# Patient Record
Sex: MALE | Race: WHITE
[De-identification: names, ages, dates, MRNs, and addresses within clinical notes are randomized per-mention and may not be internally consistent; named-entity substitution may affect disease eponyms.]

---

## 2017-01-22 ENCOUNTER — HOSPITAL ENCOUNTER (EMERGENCY)
Dept: HOSPITAL 25 - ED | Age: 82
Discharge: HOME | End: 2017-01-22
Payer: MEDICARE

## 2017-01-22 ENCOUNTER — HOSPITAL ENCOUNTER (EMERGENCY)
Dept: HOSPITAL 25 - UCEAST | Age: 82
Discharge: LEFT BEFORE DISCHARGE | End: 2017-01-22
Payer: MEDICARE

## 2017-01-22 VITALS — DIASTOLIC BLOOD PRESSURE: 56 MMHG | SYSTOLIC BLOOD PRESSURE: 130 MMHG

## 2017-01-22 VITALS — SYSTOLIC BLOOD PRESSURE: 117 MMHG | DIASTOLIC BLOOD PRESSURE: 62 MMHG

## 2017-01-22 DIAGNOSIS — J18.9: Primary | ICD-10-CM

## 2017-01-22 DIAGNOSIS — Z87.891: ICD-10-CM

## 2017-01-22 DIAGNOSIS — Z96.653: ICD-10-CM

## 2017-01-22 DIAGNOSIS — R07.9: ICD-10-CM

## 2017-01-22 DIAGNOSIS — R05: ICD-10-CM

## 2017-01-22 DIAGNOSIS — R07.89: ICD-10-CM

## 2017-01-22 DIAGNOSIS — I10: ICD-10-CM

## 2017-01-22 LAB
ALBUMIN SERPL BCG-MCNC: 3.9 G/DL (ref 3.2–5.2)
ALP SERPL-CCNC: 44 U/L (ref 34–104)
ALT SERPL W P-5'-P-CCNC: 16 U/L (ref 7–52)
ANION GAP SERPL CALC-SCNC: 7 MMOL/L (ref 2–11)
AST SERPL-CCNC: 22 U/L (ref 13–39)
BUN SERPL-MCNC: 14 MG/DL (ref 6–24)
BUN/CREAT SERPL: 13.7 (ref 8–20)
CALCIUM SERPL-MCNC: 9 MG/DL (ref 8.6–10.3)
CHLORIDE SERPL-SCNC: 106 MMOL/L (ref 101–111)
GLOBULIN SER CALC-MCNC: 2.4 G/DL (ref 2–4)
GLUCOSE SERPL-MCNC: 176 MG/DL (ref 70–100)
HCO3 SERPL-SCNC: 24 MMOL/L (ref 22–32)
HCT VFR BLD AUTO: 42 % (ref 42–52)
HGB BLD-MCNC: 14 G/DL (ref 14–18)
MCH RBC QN AUTO: 29 PG (ref 27–31)
MCHC RBC AUTO-ENTMCNC: 33 G/DL (ref 31–36)
MCV RBC AUTO: 89 FL (ref 80–94)
POTASSIUM SERPL-SCNC: 3.9 MMOL/L (ref 3.5–5)
PROT SERPL-MCNC: 6.3 G/DL (ref 6.4–8.9)
RBC # BLD AUTO: 4.76 10^6/UL (ref 4–5.4)
SODIUM SERPL-SCNC: 137 MMOL/L (ref 133–145)
TROPONIN I SERPL-MCNC: 0.01 NG/ML (ref ?–0.04)
WBC # BLD AUTO: 6.4 10^3/UL (ref 3.5–10.8)

## 2017-01-22 PROCEDURE — G0463 HOSPITAL OUTPT CLINIC VISIT: HCPCS

## 2017-01-22 PROCEDURE — 96374 THER/PROPH/DIAG INJ IV PUSH: CPT

## 2017-01-22 PROCEDURE — 83880 ASSAY OF NATRIURETIC PEPTIDE: CPT

## 2017-01-22 PROCEDURE — 85025 COMPLETE CBC W/AUTO DIFF WBC: CPT

## 2017-01-22 PROCEDURE — 99213 OFFICE O/P EST LOW 20 MIN: CPT

## 2017-01-22 PROCEDURE — 71020: CPT

## 2017-01-22 PROCEDURE — 80053 COMPREHEN METABOLIC PANEL: CPT

## 2017-01-22 PROCEDURE — 84484 ASSAY OF TROPONIN QUANT: CPT

## 2017-01-22 PROCEDURE — 99283 EMERGENCY DEPT VISIT LOW MDM: CPT

## 2017-01-22 PROCEDURE — 83605 ASSAY OF LACTIC ACID: CPT

## 2017-01-22 PROCEDURE — 36415 COLL VENOUS BLD VENIPUNCTURE: CPT

## 2017-01-22 PROCEDURE — 93005 ELECTROCARDIOGRAM TRACING: CPT

## 2017-01-22 NOTE — RAD
INDICATION:  Cough and fever.



COMPARISON:  Comparison is made with a prior chest x-ray study from June 13, 2015.



TECHNIQUE: Dual-energy PA  and lateral views of the chest were obtained.



FINDINGS:   The heart is within normal limits in size. Mediastinal and hilar contours

appear within normal limits.



The lungs are underinflated. There are small bibasilar infiltrates. No pleural effusion is

seen. There is flattening of the diaphragms consistent with chronic character pulmonary

disease.



IMPRESSION:  EXPIRATORY EXAM, SMALL BIBASILAR INFILTRATES.

## 2017-01-22 NOTE — UC
Respiratory Complaint HPI





- HPI Summary


HPI Summary: 





Pt c/o cough, fever, chest pain, SOB X 3 days.  Feels worse today.  Has history 

of pneumonia





- History of Current Complaint


Chief Complaint: UCRespiratory


Stated Complaint: COUGH,FEVER


Time Seen by Provider: 01/22/17 15:48


Hx Obtained From: Patient


Onset/Duration: Gradual Onset, Lasting Days, Worse Since - onset


Timing: Constant


Severity Initially: Mild


Severity Currently: Moderate


Character: Cough: Productive - clear


Aggravating Factors: Recumbent Position


Alleviating Factors: Nothing


Associated Signs And Symptoms: Positive: Fever, Chills, URI, Nasal Congestion





- Risk Factors


Pulmonary Embolism Risk Factors: Negative


Cardiac Risk Factors: Hypertension, Elevated Lipids


Pseudomonas Risk Factors: Negative





- Allergies/Home Medications


Allergies/Adverse Reactions: 


 Allergies











Allergy/AdvReac Type Severity Reaction Status Date / Time


 


No Known Allergies Allergy   Verified 01/22/17 15:46











Home Medications: 


 Home Medications





Ascorbic Acid TAB* [Vitamin C  TAB*] 2,000 mg PO DAILY 01/22/17 [History 

Confirmed 01/22/17]


Rx Nasal Spray*  01/22/17 [History]


guaiFENesin ER TAB [Mucinex*]  PRN 01/22/17 [History]











PMH/Surg Hx/FS Hx/Imm Hx


Previously Healthy: No - see PMH


Endocrine History Of: 


   Denies: Diabetes, Thyroid Disease


Cardiovascular History Of: Reports: Hypertension - meds


   Denies: Cardiac Disorders, Pacemaker/ICD


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


   Denies: COPD


GI/ History Of: 


   Denies: Ulcer, Renal Disease





- Surgical History


Surgical History: Yes


Surgery Procedure, Year, and Place: Bilateral Knees- REPLACEMENTS, Chinchilla Magan,

.  SINUS





- Family History


Known Family History: Positive: Cardiac Disease, Hypertension





- Social History


Lives: With Family


Alcohol Use: Occasionally


Alcohol Amount: Glass of wine


Substance Use Type: None


Smoking Status (MU): Former Smoker


Type: Cigarettes


Amount Used/How Often: 1 PPD


Length of Time of Smoking/Using Tobacco: 5-6 years


Have You Smoked in the Last Year: No





- Immunization History


Most Recent Influenza Vaccination: 10/2014





Review of Systems


Constitutional: Fever, Chills, Fatigue


Skin: Negative


Eyes: Negative


ENT: Other - nasal congestion


Respiratory: Shortness Of Breath, Cough


Cardiovascular: Chest Pain


Gastrointestinal: Negative


Genitourinary: Negative


Motor: Negative


Neurovascular: Negative


Musculoskeletal: Myalgia


Neurological: Negative


Psychological: Negative


All Other Systems Reviewed And Are Negative: Yes





Physical Exam


Triage Information Reviewed: Yes


Appearance: Ill-Appearing


Vital Signs: 


 Initial Vital Signs











Temp  100.2 F   01/22/17 15:42


 


Pulse  85   01/22/17 15:42


 


Resp  20   01/22/17 15:42


 


BP  109/63   01/22/17 15:42


 


Pulse Ox  94   01/22/17 15:42











Vital Signs Reviewed: Yes


ENT Exam: Other


ENT: Positive: Nasal congestion


Neck exam: Normal


Respiratory: Positive: Decreased breath sounds - bilateral bases


Cardiovascular Exam: Normal


Musculoskeletal Exam: Normal


Neurological Exam: Normal


Psychological Exam: Normal


Skin Exam: Normal





[facility] Diagnostic Evaluation





- Laboratory


O2 Sat by Pulse Oximetry: 94





Respiratory Course/Dx





- Course


Course Of Treatment: I reviewed the EKG with the patient and though the EKG 

does not reveal an acute cardiac event we can not rule that out and have 

recommended that the pt seek care at the Emergency Department.





- Differential Dx/Diagnosis


Differential Diagnosis/HQI/PQRI: Pulmonary Embolism, Other - pneumonia, MI


Provider Diagnoses: pneumonia.  Chest pain





- Physician Notification/Consults


Discussed Patient Care With: Concepción Castaneda at [facility].


Time Discussed With Above Provider: 17:47 - pt accepted at [facility] ED





Discharge





- Discharge Plan


Condition: Stable


Disposition: HOME


Prescriptions: 


Albuterol HFA INHALER* [Ventolin HFA Inhaler*] 1 - 2 puff INH Q4H PRN #1 mdi


 PRN Reason: Sob/Wheezing


Azithromycin TAB* [Zithromax TAB (Z-FREYA) 250 mg #6 tabs] 2 tab PO .TODAY, THEN 

1 DAILY #1 freya


predniSONE TAB* [Deltasone TAB*] 30 mg PO DAILY #12 tab


Patient Education Materials:  Pneumonia (ED)


Referrals: 


Aminah Marroquin [Primary Care Provider] - 


Additional Instructions: 


Please note that you have been recommend to go to the Emergency room for 

immediate follow up care.

## 2017-01-22 NOTE — ED
Kar KRISHNAN Janilya, scribed for Kalpesh Pichardo MD on 01/22/17 at 1840 .





Respiratory





- HPI Summary


HPI Summary: 


A 81 y/o male came in to [facility] presenting w/ a gradual onset of constant CP due 

to severe cough starting 4-5 days. Pt reports his chest feels painful and it is 

"a struggle to cough things up". Pt has a cough that produces clear, white, and 

sometimes yellowish phlegm. In addition, pt reports fever. Pt visited [facility] and 

had CXR and EKG done. There, he was diagnosed with pneumonia. Pt was 

recommended a breathing nebulizer, which he used. It alleviated his condition. 

Pt feels better now; his CP is "diminishing rapidly". 


PMHx asthma, for which pt uses inhaler.





- History of Current Complaint


Chief Complaint: EDChestPainROMI


Stated Complaint: CHEST PAIN/CONV CARE


Time Seen by Provider: 01/22/17 18:26


Hx Obtained From: Patient


Onset/Duration: Gradual Onset, Lasting Days, Still Present


Initial Severity: Moderate


Current Severity: Moderate


Pain Intensity: 0


Character: Cough (Productive)


Sputum Color: Clear, White, Yellow


Aggravating Factor(s): Nothing


Alleviating Factor(s): Oxygen


Associated Signs and Symptoms: Fever, Chest Pain with Cough





- Allergy/Home Medications


Allergies/Adverse Reactions: 


 Allergies











Allergy/AdvReac Type Severity Reaction Status Date / Time


 


No Known Allergies Allergy   Verified 01/22/17 15:46














PMH/Surg Hx/FS Hx/Imm Hx


Endocrine/Hematology History: 


   Denies: Hx Diabetes, Hx Thyroid Disease


Cardiovascular History: Reports: Hx Hypertension - meds


   Denies: Hx Pacemaker/ICD


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


   Denies: Hx Chronic Obstructive Pulmonary Disease (COPD)


GI History: 


   Denies: Hx Ulcer


 History: 


   Denies: Hx Renal Disease


Sensory History: 


   Denies: Hx Hearing Aid


Psychiatric History: 


   Denies: Hx Panic Disorder





- Cancer History


Cancer Type, Location and Year: Basal cell removed from forehead 2012





- Surgical History


Surgery Procedure, Year, and Place: Bilateral Knees- REPLACEMENTS, Chinchilla Magan,

.  SINUS


Infectious Disease History: No


Infectious Disease History: 


   Denies: Hx Clostridium Difficile, Hx Hepatitis, Hx Human Immunodeficiency 

Virus (HIV), Hx of Known/Suspected MRSA, Hx Shingles, Hx Tuberculosis, Hx Known/

Suspected VRE, Hx Known/Suspected VRSA, History Other Infectious Disease, 

Traveled Outside the [place] in Last 30 Days





- Family History


Known Family History: Positive: Cardiac Disease, Hypertension





- Social History


Alcohol Use: Occasionally


Alcohol Amount: Glass of wine


Substance Use Type: Reports: None


Smoking Status (MU): Former Smoker


Type: Cigarettes


Amount Used/How Often: 1 PPD


Length of Time of Smoking/Using Tobacco: 5-6 years


Have You Smoked in the Last Year: No





Review of Systems


Positive: Fever


Positive: Chest Pain


Positive: Cough


All Other Systems Reviewed And Are Negative: Yes





Physical Exam


Triage Information Reviewed: Yes


Vital Signs On Initial Exam: 


 Initial Vitals











Temp Pulse Resp BP Pulse Ox


 


 99.2 F   95   16   141/65   95 


 


 01/22/17 18:04  01/22/17 18:04  01/22/17 18:04  01/22/17 18:04  01/22/17 18:04











Vital Signs Reviewed: Yes


Appearance: Positive: Well-Appearing, No Pain Distress


Skin: Positive: Warm, Skin Color Reflects Adequate Perfusion, Dry


Head/Face: Positive: Normal Head/Face Inspection


Eyes: Positive: Normal


ENT: Positive: Normal ENT inspection


Neck: Positive: Supple, Nontender


Respiratory/Lung Sounds: Positive: Clear to Auscultation, Breath Sounds Present


Cardiovascular: Positive: RRR


Abdomen Description: Positive: Nontender, Soft


Bowel Sounds: Positive: Present


Musculoskeletal: Positive: Normal


Neurological: Positive: Normal


Psychiatric: Positive: Normal, Affect/Mood Appropriate





Diagnostics





- Vital Signs


 Vital Signs











  Temp Pulse Resp BP Pulse Ox


 


 01/22/17 18:24   88  13   93


 


 01/22/17 18:22     119/43 


 


 01/22/17 18:04  99.2 F  95  16  141/65  95














- Laboratory


Lab Results: 


 Lab Results











  01/22/17 01/22/17 01/22/17 Range/Units





  18:25 18:25 18:25 


 


WBC  6.4    (3.5-10.8)  10^3/ul


 


RBC  4.76    (4.0-5.4)  10^6/ul


 


Hgb  14.0    (14.0-18.0)  g/dl


 


Hct  42    (42-52)  %


 


MCV  89    (80-94)  fL


 


MCH  29    (27-31)  pg


 


MCHC  33    (31-36)  g/dl


 


RDW  15    (10.5-15)  %


 


Plt Count  166    (150-450)  10^3/ul


 


MPV  8    (7.4-10.4)  um3


 


Neut % (Auto)  63.0    (38-83)  %


 


Lymph % (Auto)  23.7 L    (25-47)  %


 


Mono % (Auto)  9.4 H    (1-9)  %


 


Eos % (Auto)  3.3    (0-6)  %


 


Baso % (Auto)  0.6    (0-2)  %


 


Absolute Neuts (auto)  4.0    (1.5-7.7)  10^3/ul


 


Absolute Lymphs (auto)  1.5    (1.0-4.8)  10^3/ul


 


Absolute Monos (auto)  0.6    (0-0.8)  10^3/ul


 


Absolute Eos (auto)  0.2    (0-0.6)  10^3/ul


 


Absolute Basos (auto)  0    (0-0.2)  10^3/ul


 


Absolute Nucleated RBC  0    10^3/ul


 


Nucleated RBC %  0.1    


 


Sodium   137   (133-145)  mmol/L


 


Potassium   3.9   (3.5-5.0)  mmol/L


 


Chloride   106   (101-111)  mmol/L


 


Carbon Dioxide   24   (22-32)  mmol/L


 


Anion Gap   7   (2-11)  mmol/L


 


BUN   14   (6-24)  mg/dL


 


Creatinine   1.02   (0.67-1.17)  mg/dL


 


Est GFR ( Amer)   89.9   (>60)  


 


Est GFR (Non-Af Amer)   69.9   (>60)  


 


BUN/Creatinine Ratio   13.7   (8-20)  


 


Glucose   176 H   ()  mg/dL


 


Lactic Acid    1.4  (0.5-2.0)  mmol/L


 


Calcium   9.0   (8.6-10.3)  mg/dL


 


Total Bilirubin   0.60   (0.2-1.0)  mg/dL


 


AST   22   (13-39)  U/L


 


ALT   16   (7-52)  U/L


 


Alkaline Phosphatase   44   ()  U/L


 


Troponin I   0.01   (<0.04)  ng/mL


 


B-Natriuretic Peptide    ( - 100) pg/mL


 


Total Protein   6.3 L   (6.4-8.9)  g/dL


 


Albumin   3.9   (3.2-5.2)  g/dL


 


Globulin   2.4   (2-4)  g/dL


 


Albumin/Globulin Ratio   1.6   (1-3)  














  01/22/17 Range/Units





  18:25 


 


WBC   (3.5-10.8)  10^3/ul


 


RBC   (4.0-5.4)  10^6/ul


 


Hgb   (14.0-18.0)  g/dl


 


Hct   (42-52)  %


 


MCV   (80-94)  fL


 


MCH   (27-31)  pg


 


MCHC   (31-36)  g/dl


 


RDW   (10.5-15)  %


 


Plt Count   (150-450)  10^3/ul


 


MPV   (7.4-10.4)  um3


 


Neut % (Auto)   (38-83)  %


 


Lymph % (Auto)   (25-47)  %


 


Mono % (Auto)   (1-9)  %


 


Eos % (Auto)   (0-6)  %


 


Baso % (Auto)   (0-2)  %


 


Absolute Neuts (auto)   (1.5-7.7)  10^3/ul


 


Absolute Lymphs (auto)   (1.0-4.8)  10^3/ul


 


Absolute Monos (auto)   (0-0.8)  10^3/ul


 


Absolute Eos (auto)   (0-0.6)  10^3/ul


 


Absolute Basos (auto)   (0-0.2)  10^3/ul


 


Absolute Nucleated RBC   10^3/ul


 


Nucleated RBC %   


 


Sodium   (133-145)  mmol/L


 


Potassium   (3.5-5.0)  mmol/L


 


Chloride   (101-111)  mmol/L


 


Carbon Dioxide   (22-32)  mmol/L


 


Anion Gap   (2-11)  mmol/L


 


BUN   (6-24)  mg/dL


 


Creatinine   (0.67-1.17)  mg/dL


 


Est GFR ( Amer)   (>60)  


 


Est GFR (Non-Af Amer)   (>60)  


 


BUN/Creatinine Ratio   (8-20)  


 


Glucose   ()  mg/dL


 


Lactic Acid   (0.5-2.0)  mmol/L


 


Calcium   (8.6-10.3)  mg/dL


 


Total Bilirubin   (0.2-1.0)  mg/dL


 


AST   (13-39)  U/L


 


ALT   (7-52)  U/L


 


Alkaline Phosphatase   ()  U/L


 


Troponin I   (<0.04)  ng/mL


 


B-Natriuretic Peptide  35 ( - 100) pg/mL


 


Total Protein   (6.4-8.9)  g/dL


 


Albumin   (3.2-5.2)  g/dL


 


Globulin   (2-4)  g/dL


 


Albumin/Globulin Ratio   (1-3)  











Result Diagrams: 


 01/22/17 18:25





 01/22/17 18:25


Lab Statement: Any lab studies that have been ordered have been reviewed, and 

results considered in the medical decision making process.





- EKG


  ** 1622


Cardiac Rate: NL - at 84 bpm


EKG Rhythm: Sinus Rhythm





Disposition





- Course


Course Of Treatment: Everette Balbuena Presented with a few days of coughing and 

was specifically C/O having more and more chest pain with coughing.   He was 

seen at [facility] and given a nebulized and feels a lot better and says his chest 

feels better. He was given his first dose of steroids IV here and his trop was 

negative.





- Diagnoses


Provider Diagnoses: 


 Pneumonia, Chest wall pain








Discharge





- Discharge Plan


Condition: Stable


Disposition: HOME


Patient Education Materials:  Pneumonia (ED), Chest Pain (ED)


Referrals: 


Mera Davila MD [Primary Care Provider] - 


Additional Instructions: 


Follow up with your primary care provider if symptoms persist or worsen.





The documentation as recorded by the Kar knight Janilya accurately 

reflects the service I personally performed and the decisions made by me, Kalpesh Pichardo MD.

## 2017-01-30 ENCOUNTER — HOSPITAL ENCOUNTER (EMERGENCY)
Dept: HOSPITAL 25 - ED | Age: 82
Discharge: HOME | End: 2017-01-30
Payer: MEDICARE

## 2017-01-30 VITALS — SYSTOLIC BLOOD PRESSURE: 138 MMHG | DIASTOLIC BLOOD PRESSURE: 64 MMHG

## 2017-01-30 DIAGNOSIS — Z87.891: ICD-10-CM

## 2017-01-30 DIAGNOSIS — J44.9: ICD-10-CM

## 2017-01-30 DIAGNOSIS — R05: Primary | ICD-10-CM

## 2017-01-30 PROCEDURE — 71020: CPT

## 2017-01-30 PROCEDURE — 99283 EMERGENCY DEPT VISIT LOW MDM: CPT

## 2017-01-30 NOTE — ED
John KRISHNAN Adam, scribed for Kalpesh Pichardo MD on 01/30/17 at 1300 .





Respiratory





- HPI Summary


HPI Summary: 


Pt is an 82 year old male presenting with an episode of aspiration this 

morning. He states that he was taking his regular medication between 11:00 and 

12:00 when he swallowed a pill and began coughing. He states that he coughed up 

some yellow substance which matched the color of the pill, but he is concerned 

that there could still be something lodged in his throat. He is also feeling 

more congested since the episode of coughing. 








- History of Current Complaint


Chief Complaint: EDRespiratoryDistress


Stated Complaint: MEDS POSS STUCK IN THROAT


Time Seen by Provider: 01/30/17 12:48


Hx Obtained From: Patient


Onset/Duration: Sudden Onset, Lasting Minutes, Resolved


Initial Severity: Moderate


Current Severity: None


Pain Intensity: 0


Character: Cough (Productive)


Sputum Amount: Small


Sputum Color: Clear


Aggravating Factor(s): Nothing


Alleviating Factor(s): Nothing





- Allergy/Home Medications


Allergies/Adverse Reactions: 


 Allergies











Allergy/AdvReac Type Severity Reaction Status Date / Time


 


No Known Allergies Allergy   Verified 01/22/17 15:46














PMH/Surg Hx/FS Hx/Imm Hx


Endocrine/Hematology History: 


   Denies: Hx Diabetes, Hx Thyroid Disease


Cardiovascular History: Reports: Hx Hypertension - meds


   Denies: Hx Pacemaker/ICD


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


   Denies: Hx Chronic Obstructive Pulmonary Disease (COPD)


GI History: 


   Denies: Hx Ulcer


 History: 


   Denies: Hx Renal Disease


Sensory History: 


   Denies: Hx Hearing Aid


Psychiatric History: 


   Denies: Hx Panic Disorder





- Cancer History


Cancer Type, Location and Year: Basal cell removed from forehead 2012





- Surgical History


Surgery Procedure, Year, and Place: Bilateral Knees- REPLACEMENTS, Chinchilla Magan,

.  SINUS


Infectious Disease History: No


Infectious Disease History: 


   Denies: Hx Clostridium Difficile, Hx Hepatitis, Hx Human Immunodeficiency 

Virus (HIV), Hx of Known/Suspected MRSA, Hx Shingles, Hx Tuberculosis, Hx Known/

Suspected VRE, Hx Known/Suspected VRSA, History Other Infectious Disease, 

Traveled Outside the [place] in Last 30 Days





- Family History


Known Family History: Positive: Cardiac Disease, Hypertension





- Social History


Occupation: Retired


Lives: With Family


Alcohol Use: Occasionally


Alcohol Amount: Glass of wine


Hx Substance Use: No


Substance Use Type: Reports: None


Hx Tobacco Use: Yes


Smoking Status (MU): Former Smoker


Type: Cigarettes


Amount Used/How Often: 1 PPD


Length of Time of Smoking/Using Tobacco: 5-6 years


Have You Smoked in the Last Year: No





Review of Systems


Positive: Other - Congestion


Positive: Cough, Other - Aspirated on pill


All Other Systems Reviewed And Are Negative: Yes





Physical Exam


Triage Information Reviewed: Yes


Vital Signs On Initial Exam: 


 Initial Vitals











Temp Pulse Resp BP Pulse Ox


 


 97.9 F   87   20   143/66   94 


 


 01/30/17 12:06  01/30/17 12:06  01/30/17 12:06  01/30/17 12:06  01/30/17 12:06











Vital Signs Reviewed: Yes


Appearance: Positive: Well-Appearing, No Pain Distress


Skin: Positive: Warm, Skin Color Reflects Adequate Perfusion, Dry


Head/Face: Positive: Normal Head/Face Inspection


Eyes: Positive: Normal


ENT: Positive: Normal ENT inspection


Neck: Positive: Supple, Nontender


Respiratory/Lung Sounds: Positive: Clear to Auscultation, Breath Sounds Present


Cardiovascular: Positive: RRR


Abdomen Description: Positive: Nontender, Soft


Bowel Sounds: Positive: Present


Musculoskeletal: Positive: Normal


Neurological: Positive: Normal


Psychiatric: Positive: Normal, Affect/Mood Appropriate





- [place] Coma Scale


Coma Scale Total: 15





Diagnostics





- Vital Signs


 Vital Signs











  Temp Pulse Resp BP Pulse Ox


 


 01/30/17 12:13    20  


 


 01/30/17 12:06  97.9 F  87  20  143/66  94














- Laboratory


Lab Statement: Any lab studies that have been ordered have been reviewed, and 

results considered in the medical decision making process.





- Radiology


  ** CXR


Radiology Interpretation Completed By: Radiologist - IMPRESSION:  FINDINGS 

CONSISTENT WITH COPD, NO EVIDENCE FOR ACUTE FINDING.





Disposition





- Course


Course Of Treatment: Mr. Balbuena apparently aspirated a turmeric gel cap and had 

some paroxismal coughing bring up yellow sputum and some plastic like pieces.  

He feels a lot better now and his CXR is normal.  I will D/C him for expectant 

treatment and we discussed fever etc.





- Diagnoses


Provider Diagnoses: 


 Aspiration








Discharge





- Discharge Plan


Condition: Stable


Disposition: HOME


Patient Education Materials:  Dyspnea (ED)


Referrals: 


Mera Davila MD [Primary Care Provider] - 


Additional Instructions: 


Follow up with Dr. Davila.





The documentation as recorded by the John knight Adam accurately reflects 

the service I personally performed and the decisions made by me, Kalpesh Pichardo MD.

## 2017-01-30 NOTE — RAD
INDICATION:  Choking episode.



COMPARISON:  Comparison is made with a prior chest x-ray study from January 22, 2017.



TECHNIQUE: Dual-energy PA  and lateral views of the chest were obtained.



FINDINGS:   The heart is upper limits of normal in size and unchanged from the prior exam.



The lungs are clear. There is flattening of the diaphragms consistent with chronic

obstructive pulmonary disease. No pleural effusion is seen. 



IMPRESSION:  FINDINGS CONSISTENT WITH COPD, NO EVIDENCE FOR ACUTE FINDING.

## 2017-12-15 ENCOUNTER — HOSPITAL ENCOUNTER (EMERGENCY)
Dept: HOSPITAL 25 - UCEAST | Age: 82
Discharge: HOME | End: 2017-12-15
Payer: MEDICARE

## 2017-12-15 VITALS — DIASTOLIC BLOOD PRESSURE: 63 MMHG | SYSTOLIC BLOOD PRESSURE: 109 MMHG

## 2017-12-15 DIAGNOSIS — J06.9: Primary | ICD-10-CM

## 2017-12-15 DIAGNOSIS — Z72.89: ICD-10-CM

## 2017-12-15 DIAGNOSIS — Z87.891: ICD-10-CM

## 2017-12-15 PROCEDURE — 99212 OFFICE O/P EST SF 10 MIN: CPT

## 2017-12-15 PROCEDURE — G0463 HOSPITAL OUTPT CLINIC VISIT: HCPCS

## 2017-12-15 NOTE — UC
Respiratory Complaint HPI





- HPI Summary


HPI Summary: 


2 DAYS OF WORSENING COUGH AND CONGESTION. NO FEVER, EAR PAIN, N/V/D. REPORTS HE 

HAS A TENDENCY TO PROGRESS TO "WALKING PNEUMONIA" AND IS CONCERNED THIS WILL 

HAPPEN AGAIN WITH THIS ILLNESS.





- History of Current Complaint


Chief Complaint: UCRespiratory


Stated Complaint: CHEST CONGESTION


Time Seen by Provider: 12/15/17 10:53


Hx Obtained From: Patient


Onset/Duration: Gradual Onset, Lasting Days, Still Present


Timing: Constant


Severity Initially: Moderate


Severity Currently: Moderate


Pain Intensity: 0


Pain Scale Used: 0-10 Numeric


Character: Cough: Productive


Aggravating Factors: Nothing


Alleviating Factors: Nothing


Associated Signs And Symptoms: Positive: URI, Nasal Congestion.  Negative: 

Dyspnea, Fever, Pleuritic Chest Pain, Wheezing





- Allergies/Home Medications


Allergies/Adverse Reactions: 


 Allergies











Allergy/AdvReac Type Severity Reaction Status Date / Time


 


No Known Allergies Allergy   Verified 12/15/17 08:48











Home Medications: 


 Home Medications





Albuterol HFA INHALER* [Ventolin HFA Inhaler*] 1 puff INH Q6HR PRN 12/15/17 [

History Confirmed 12/15/17]


Ascorbic Acid TAB* [Vitamin C  TAB*] 500 mg PO DAILY 12/15/17 [History 

Confirmed 12/15/17]


Aspirin EC Low Dose* [Ecotrin EC Low Dose 81 MG*] 81 mg PO DAILY 12/15/17 [

History Confirmed 12/15/17]


Rosuvastatin Calcium [Crestor] 20 mg PO DAILY 12/15/17 [History Confirmed 12/15/

17]


Sertraline* [Zoloft*] 100 mg PO BEDTIME 12/15/17 [History Confirmed 12/15/17]


Telmisartan [Micardis] 0.5 mg PO DAILY 12/15/17 [History Confirmed 12/15/17]











PMH/Surg Hx/FS Hx/Imm Hx


Cardiovascular History: Hypertension





- Surgical History


Surgical History: Yes


Surgery Procedure, Year, and Place: Bilateral Knees- REPLACEMENTS, Chinchilla Magan,

.  SINUS





- Family History


Known Family History: Positive: Cardiac Disease, Hypertension





- Social History


Alcohol Use: Occasionally


Alcohol Amount: Glass of wine


Substance Use Type: None


Smoking Status (MU): Former Smoker


Type: Cigarettes


Amount Used/How Often: 1 PPD


Length of Time of Smoking/Using Tobacco: 5-6 years


Have You Smoked in the Last Year: No





- Immunization History


Most Recent Influenza Vaccination: 10/2014





Review of Systems


Constitutional: Negative


ENT: Nasal Discharge


Respiratory: Cough


Cardiovascular: Negative


Gastrointestinal: Negative


Neurological: Headache


All Other Systems Reviewed And Are Negative: Yes





Physical Exam


Triage Information Reviewed: Yes


Appearance: Well-Appearing, No Pain Distress, Well-Nourished


Vital Signs: 


 Initial Vital Signs











Temp  97 F   12/15/17 08:53


 


Pulse  79   12/15/17 08:53


 


Resp  16   12/15/17 08:53


 


BP  118/53   12/15/17 08:53


 


Pulse Ox  100   12/15/17 08:53











Vital Signs Reviewed: Yes


Eyes: Positive: Conjunctiva Clear


ENT: Positive: Hearing grossly normal, Pharynx normal, TMs normal


Neck: Positive: Supple, Nontender, No Lymphadenopathy


Respiratory Exam: Normal


Cardiovascular Exam: Normal


Abdomen Description: Positive: Soft


Musculoskeletal: Positive: No Edema


Neurological: Positive: Alert


Psychological: Positive: Age Appropriate Behavior


Skin: Negative: rashes





[facility] Diagnostic Evaluation





- Laboratory


O2 Sat by Pulse Oximetry: 97





Respiratory Course/Dx





- Differential Dx/Diagnosis


Provider Diagnoses: ACUTE URI





Discharge





- Discharge Plan


Condition: Stable


Disposition: HOME


Prescriptions: 


Azithromycin [Azithromycin 500 MG TAB] 500 mg PO DAILY #5 tab


Patient Education Materials:  Upper Respiratory Infection (ED), Acute 

Bronchitis (ED)


Referrals: 


Mera Davila MD [Primary Care Provider] - If Needed


Additional Instructions: 


YOUR SYMPTOMS MAY BE VIRALLY MEDIATED BUT GIVEN YOUR HISTORY AND CONCERN FOR 

PROGRESSION TO PNEUMONIA WE WILL COVER YOU WITH ANTIBIOTICS. IF YOU START THE 

MEDICINE BE SURE TO TAKE IT FOR THE FULL COURSE. REST, HYDRATE, OTC MEDS AS 

NEEDED. SEEK FOLLOW-UP WITH YOUR PCP IF YOU ARE NOT IMPROVING OVER THE NEXT 1-2 

WEEKS.

## 2018-01-16 ENCOUNTER — HOSPITAL ENCOUNTER (OUTPATIENT)
Dept: HOSPITAL 25 - ED | Age: 83
Setting detail: OBSERVATION
LOS: 1 days | Discharge: HOME | End: 2018-01-17
Attending: INTERNAL MEDICINE | Admitting: HOSPITALIST
Payer: MEDICARE

## 2018-01-16 DIAGNOSIS — E03.9: ICD-10-CM

## 2018-01-16 DIAGNOSIS — E78.5: ICD-10-CM

## 2018-01-16 DIAGNOSIS — F32.9: ICD-10-CM

## 2018-01-16 DIAGNOSIS — I10: ICD-10-CM

## 2018-01-16 DIAGNOSIS — R07.9: Primary | ICD-10-CM

## 2018-01-16 DIAGNOSIS — Z87.891: ICD-10-CM

## 2018-01-16 DIAGNOSIS — Z79.899: ICD-10-CM

## 2018-01-16 LAB
BASOPHILS # BLD AUTO: 0 10^3/UL (ref 0–0.2)
EOSINOPHIL # BLD AUTO: 0.1 10^3/UL (ref 0–0.6)
HCT VFR BLD AUTO: 43 % (ref 42–52)
HGB BLD-MCNC: 14.7 G/DL (ref 14–18)
LYMPHOCYTES # BLD AUTO: 2.1 10^3/UL (ref 1–4.8)
MCH RBC QN AUTO: 31 PG (ref 27–31)
MCHC RBC AUTO-ENTMCNC: 35 G/DL (ref 31–36)
MCV RBC AUTO: 88 FL (ref 80–94)
MONOCYTES # BLD AUTO: 0.7 10^3/UL (ref 0–0.8)
NEUTROPHILS # BLD AUTO: 5.2 10^3/UL (ref 1.5–7.7)
NRBC # BLD AUTO: 0 10^3/UL
NRBC BLD QL AUTO: 0
PLATELET # BLD AUTO: 196 10^3/UL (ref 150–450)
RBC # BLD AUTO: 4.82 10^6/UL (ref 4–5.4)
WBC # BLD AUTO: 8.1 10^3/UL (ref 3.5–10.8)

## 2018-01-16 PROCEDURE — 96372 THER/PROPH/DIAG INJ SC/IM: CPT

## 2018-01-16 PROCEDURE — 96360 HYDRATION IV INFUSION INIT: CPT

## 2018-01-16 PROCEDURE — A9502 TC99M TETROFOSMIN: HCPCS

## 2018-01-16 PROCEDURE — 84484 ASSAY OF TROPONIN QUANT: CPT

## 2018-01-16 PROCEDURE — 85025 COMPLETE CBC W/AUTO DIFF WBC: CPT

## 2018-01-16 PROCEDURE — 80061 LIPID PANEL: CPT

## 2018-01-16 PROCEDURE — 80048 BASIC METABOLIC PNL TOTAL CA: CPT

## 2018-01-16 PROCEDURE — 36415 COLL VENOUS BLD VENIPUNCTURE: CPT

## 2018-01-16 PROCEDURE — 83605 ASSAY OF LACTIC ACID: CPT

## 2018-01-16 PROCEDURE — 93005 ELECTROCARDIOGRAM TRACING: CPT

## 2018-01-16 PROCEDURE — 80053 COMPREHEN METABOLIC PANEL: CPT

## 2018-01-16 PROCEDURE — 99283 EMERGENCY DEPT VISIT LOW MDM: CPT

## 2018-01-16 PROCEDURE — 71045 X-RAY EXAM CHEST 1 VIEW: CPT

## 2018-01-16 PROCEDURE — 83036 HEMOGLOBIN GLYCOSYLATED A1C: CPT

## 2018-01-16 PROCEDURE — 83735 ASSAY OF MAGNESIUM: CPT

## 2018-01-16 PROCEDURE — 78452 HT MUSCLE IMAGE SPECT MULT: CPT

## 2018-01-16 PROCEDURE — 93017 CV STRESS TEST TRACING ONLY: CPT

## 2018-01-16 PROCEDURE — G0378 HOSPITAL OBSERVATION PER HR: HCPCS

## 2018-01-16 PROCEDURE — 96361 HYDRATE IV INFUSION ADD-ON: CPT

## 2018-01-16 RX ADMIN — HEPARIN SODIUM SCH UNITS: 5000 INJECTION INTRAVENOUS; SUBCUTANEOUS at 23:00

## 2018-01-16 NOTE — RAD
INDICATION: 2-3 days of fluctuating blood pressure and chest pressure



COMPARISON: Chest x-ray dated December 26, 2017

 

TECHNIQUE: Single AP portable view of the chest was obtained.



FINDINGS: 



Image quality is compromised due to the relative inferiority of a portable chest x-ray.



The heart and mediastinum exhibit normal size and contour.



The lungs are grossly clear. There is no evidence of a large pleural effusion.



Visualized bones are normal for the patient's age.



IMPRESSION:  No radiographic evidence for acute cardiopulmonary abnormality on this

portable chest x-ray.

## 2018-01-17 VITALS — DIASTOLIC BLOOD PRESSURE: 68 MMHG | SYSTOLIC BLOOD PRESSURE: 133 MMHG

## 2018-01-17 LAB
BASOPHILS # BLD AUTO: 0 10^3/UL (ref 0–0.2)
EOSINOPHIL # BLD AUTO: 0.2 10^3/UL (ref 0–0.6)
HCT VFR BLD AUTO: 41 % (ref 42–52)
HGB BLD-MCNC: 13.8 G/DL (ref 14–18)
LYMPHOCYTES # BLD AUTO: 1.7 10^3/UL (ref 1–4.8)
MCH RBC QN AUTO: 30 PG (ref 27–31)
MCHC RBC AUTO-ENTMCNC: 34 G/DL (ref 31–36)
MCV RBC AUTO: 89 FL (ref 80–94)
MONOCYTES # BLD AUTO: 0.5 10^3/UL (ref 0–0.8)
NEUTROPHILS # BLD AUTO: 3 10^3/UL (ref 1.5–7.7)
NRBC # BLD AUTO: 0 10^3/UL
NRBC BLD QL AUTO: 0
PLATELET # BLD AUTO: 173 10^3/UL (ref 150–450)
RBC # BLD AUTO: 4.58 10^6/UL (ref 4–5.4)
WBC # BLD AUTO: 5.3 10^3/UL (ref 3.5–10.8)

## 2018-01-17 RX ADMIN — HEPARIN SODIUM SCH: 5000 INJECTION INTRAVENOUS; SUBCUTANEOUS at 13:37

## 2018-01-17 RX ADMIN — HEPARIN SODIUM SCH UNITS: 5000 INJECTION INTRAVENOUS; SUBCUTANEOUS at 05:37

## 2018-01-17 NOTE — HP
CC:  Dr. Mera Davila *

 

ADMISSION HISTORY AND PHYSICAL:

 

DATE OF ADMISSION:  18

 

PRIMARY CARE PROVIDER:  Dr. Mera Davila.

 

MY ATTENDING WHILE IN THE HOSPITAL:  Dr. Fred Frankenberg.* (DICTATED BY 
JAVID BENOIT)

 

CHIEF COMPLAINT:  Chest pain x2 days.

 

HISTORY OF PRESENT ILLNESS:  The patient is an 83-year-old male with past 
medical history significant for hypertension, hypothyroidism, hyperlipidemia, 
impaired glucose tolerance and depression, who presents with 2 days of constant 
chest tightness with occasional shortness of breath, not brought on by 
exertion.  The patient, however, is dizzy in the morning when he stands up, but 
it is not persistent.  The patient also has intermittent palpitations, worst 
this afternoon. The patient feels like his heart occasionally skips a beat.  
The patient recently had changes in his medications, which we cannot describe, 
but when discussing his medications with him, he said that he believes he was 
starting the losartan and Crestor.  The patient denies other symptoms to go 
along with chest pain including nausea, vomiting, diaphoresis.  The patient had 
a recent hemoglobin A1c and lipid panel through his primary care provider, but 
does not remember the results.  The patient has no other symptoms.  The patient 
has no past medical history of myocardial infarction or chest pain.

 

PAST MEDICAL HISTORY:  Hypertension, depression, hypothyroidism, hyperlipidemia
, and impaired glucose tolerance.

 

PAST SURGICAL HISTORY:  Bilateral knee replacements, sinus surgery.

 

MEDICATIONS:

1.  Aspirin 81 mg p.o. daily.

2.  Losartan 50 mg p.o. daily.

3.  Synthroid 25 mcg p.o. daily.

4.  Metoprolol 50 mg p.o. b.i.d.

5.  Sertraline 100 mg p.o. daily.

6.  Crestor 10 mg p.o. daily.

7.  Celadrin.

8.  Multivitamin.

 

ALLERGIES:  No known drug allergies.

 

FAMILY HISTORY:  The patient's father had heart disease.  The patient's mother 
had ovarian cancer and  of that.  The patient does not have any other 
family history that he can think of.

 

SOCIAL HISTORY:  The patient was a former smoker, but quit 60 years ago.  The 
patient used to drink a glass of wine a night, but no longer does in an attempt 
to lose weight.  The patient denies any illicit drug use.  The patient is 
retired , as a , flew with [email or web address] and was  at [employer].
  The patient is  and has 2 children.

 

REVIEW OF SYSTEMS:  A 14-point review of systems was conducted and is negative 
except as above.

 

                               PHYSICAL EXAMINATION

 

GENERAL:  The patient is an 83-year-old male who appears stated age and sitting 
comfortably in bed, in no acute distress.

 

VITAL SIGNS:  Temperature 99.2, heart rate 78, respiratory rate 20, oxygen 
saturation 97% on room air, and blood pressure 155/64.

 

HEENT:  Head:  Normocephalic, atraumatic.  Sclerae anicteric.  No conjunctival 
injection.  Nasal mucosa moist.  Oral mucosa moist.  No pharyngeal erythema, 
exudate, or discharge.

 

NECK:  Supple, nontender.  No lymphadenopathy.  No carotid bruit auscultated.

 

RESPIRATORY:  Clear to auscultation bilaterally.  No wheezes, rales, or rhonchi.

 

CARDIAC:  Regular rate and rhythm.  No clicks, murmurs, gallops, or rubs.  
Pulses 2+ in the bilateral dorsalis pedis, posterior tibialis, and radial areas.

 

ABDOMEN:  Soft, nontender, distended without tenderness.  Bowel sounds present 
and normoactive in all 4 quadrants.  No hepatosplenomegaly.  No abdominal 
bruits auscultated.

 

EXTREMITIES:  No edema noted in bilateral lower extremities.

 

NEUROLOGIC:  Cranial nerves II through XII grossly intact.  No focal deficits.

 

SKIN:  Clean, dry, and intact.  Significant burden of seborrhoic keratosis.  No 
rash.

 

PSYCHIATRIC:  Pleasant and cooperative.

 

 LABORATORY DATA:  White blood cell count 8.1, hemoglobin 14.7, hematocrit 43, 
MCV 88, MCH 31, MCHC 35, RDW 15, platelet count 196.  Sodium 138, potassium 4.5
, chloride 105, carbon dioxide 28, anion gap 5, BUN 19, creatinine 0.82, 
glucose 92, lactic acid 0.8, calcium 9.1.  Bilirubin 0.5, AST 17, ALT 13, 
alkaline phosphatase 46.  Troponin-I 0.00 x2.  Total protein 6.1, albumin 3.7, 
globulin 2.4, albumin/globulin ratio 1.5.

 

DIAGNOSTIC STUDIES:  Electrocardiogram shows normal sinus rhythm, single PVC, 
no ST segment changes.  No abnormalities.  Tele in the room shows frequent 
PVCs.  Chest x- ray shows no radiographic evidence for acute cardiopulmonary 
abnormality.

 

IMPRESSION:  The patient is an 83-year-old male with past medical history 
significant for hypertension, hyperlipidemia, impaired glucose tolerance, who 
presents with 2 days of chest pain without palliating and provoking factors.  
The patient has significant risk factors for coronary artery disease based on 
age, lipid profile and aspirin use and impaired glucose tolerance.  The patient 
will be admitted overnight for telemetry monitoring and a nuclear stress test 
in the morning.

 

ASSESSMENT AND PLAN:

1.  Chest pain.  The patient's chest pain sounds suspicious for cardiac origin. 
The patient received aspirin while in the emergency room and nitroglycerin, 
which helped to relieve his pain.  The patient will be continued on aspirin, 
will have nitroglycerin available as needed.  The patient is currently chest 
pain free.  The patient will have a nuclear medicine stress test done in the 
morning to assess for ischemia.  The patient's metoprolol will be held.

2.  Hypertension.  The patient is borderline hypertensive intermittently while 
in the emergency room.  Continue losartan, hold metoprolol.

3.  Hyperlipidemia.  Continue Crestor and I will substitute to Lipitor.

4.  Hypothyroidism.  Continue Synthroid.

5.  Depression.  Continue sertraline.  The patient is currently euthymic.

6.  DVT prophylaxis.  Heparin subcu.  The patient is a high risk.

7.  FEN.  The patient will have a heart healthy diet without caffeine, followed 
by n.p.o. after midnight with fluids at 75 mL an hour.

8.  Code status.  The patient would like to be a DNR.  The patient's MOLST 
filled out and on the chart.  The patient's wife, Estrella Alves is his surrogate 
decision maker.

9.  Disposition.  The patient is admitted to observation to telemetry for a 
stress test.

 

TIME SPENT:  Approximately 60 minutes were spent on this admission, 30 of which 
were spent face-to-face with the patient obtaining history and physical and 
discussing treatment plan.

 

This plan was discussed with my attending, Dr. Fred Frankenberg and he is in 
agreement.

 

 ____________________________________ JAVID BENOIT

 

[id number] #: [id number]

MTDMATILDE

## 2018-01-17 NOTE — RAD
Edited for charges.



INDICATION: Chest pain and shortness of breath



COMPARISON: Most recent chest x-ray dated January 16, 2018

 

TECHNIQUE: SPECT imaging was performed. Rest images were acquired following the

intravenous injection of 10 millicuries of technetium 99m tetrofosmin at  0650 
hours. At 

1221 hours stress images were acquired following the intravenous administration 
of 26.82

millicuries of technetium 99m tetrofosmin.



The patient received intravenous Lexiscan prior to the stress image acquisition.



FINDINGS: There are no defects of the stress-induced or fixed nature. The 
cardiac chamber

size is normal. There are no wall motion abnormalities. The ejection fraction is

calculated at 64% during stress.



IMPRESSION:  No scintigraphic evidence of ischemia or infarction. 



ASSESSMENT:  Low risk



Based on imaging criteria from ACC/AHA 2002 Guideline Update for the Management 
of

Patients With Chronic Stable Angina 



MTDD

## 2018-01-19 NOTE — ED
Hawa KRISHNAN Gabriel, scribed for Esau Patel MD on 01/16/18 at 1758 .





HPI Chest Pain





- HPI Summary


HPI Summary: 





This patient is a 83 year old M presenting to [facility] accompanied by his wife 

with a chief complaint of CP since this afternoon. The patient rates the pain 3/

10 in severity and describes it as a heavy heart. Patient reports elevated 

blood pressure and palpitations. Patient denies radiation of pain, LE pain, Le 

edema and cough. Patient has a history of HLD and HTN with a family history of 

CAD. 





- History of Current Complaint


Chief Complaint: EDChestPainROMI


Time Seen by Provider: 01/16/18 17:51


Hx Obtained From: Patient


Onset/Duration: Started Hours Ago, Still Present


Time of Onset: 11:00


Timing: Constant


Initial Severity: Moderate


Current Severity: Moderate


Pain Intensity: 3


Pain Scale Used: 0-10 Numeric


Chest Pain Location: Diffuse


Chest Pain Radiates: No


Character: Other: - heavy heart


Associated Signs and Symptoms: Positive: Negative - radiation of pain, LE pain, 

Le edema and cough, Other: - blood pressure and palpitations





- Allergy/Home Medications


Allergies/Adverse Reactions: 


 Allergies











Allergy/AdvReac Type Severity Reaction Status Date / Time


 


No Known Allergies Allergy   Verified 12/15/17 08:48














PMH/Surg Hx/FS Hx/Imm Hx


Endocrine/Hematology History: 


   Denies: Hx Diabetes, Hx Thyroid Disease


Cardiovascular History: Reports: Hx Hypercholesterolemia, Hx Hypertension - meds


   Denies: Hx Pacemaker/ICD


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


   Denies: Hx Chronic Obstructive Pulmonary Disease (COPD)


GI History: 


   Denies: Hx Ulcer


 History: 


   Denies: Hx Renal Disease


Sensory History: 


   Denies: Hx Hearing Aid


Psychiatric History: 


   Denies: Hx Panic Disorder





- Cancer History


Cancer Type, Location and Year: Basal cell removed from forehead 2012





- Surgical History


Surgery Procedure, Year, and Place: Bilateral Knees- REPLACEMENTS, [facility],

.  SINUS


Infectious Disease History: No


Infectious Disease History: 


   Denies: Hx Clostridium Difficile, Hx Hepatitis, Hx Human Immunodeficiency 

Virus (HIV), Hx of Known/Suspected MRSA, Hx Shingles, Hx Tuberculosis, Hx Known/

Suspected VRE, Hx Known/Suspected VRSA, History Other Infectious Disease, 

Traveled Outside the [place] in Last 30 Days





- Family History


Known Family History: Positive: Cardiac Disease - father , Hypertension





- Social History


Lives: With Family


Alcohol Use: Occasionally


Alcohol Amount: Glass of wine


Hx Substance Use: No


Substance Use Type: Reports: None


Hx Tobacco Use: Yes


Smoking Status (MU): Former Smoker


Type: Cigarettes


Amount Used/How Often: 1 PPD


Length of Time of Smoking/Using Tobacco: 5-6 years


Have You Smoked in the Last Year: No





Review of Systems


Negative: Fever, Chills


Negative: Erythema


Negative: Sore Throat


Positive: Palpitations, Chest Pain, Other - elevated BP 


Negative: Shortness Of Breath, Cough


Negative: Abdominal Pain, Vomiting, Diarrhea, Nausea


Negative: dysuria, hematuria


Negative: Myalgia, Edema


Negative: Rash


Neurological: Negative - dizziness 


All Other Systems Reviewed And Are Negative: Yes





Physical Exam





- Summary


Physical Exam Summary: 





Constitutional: Well-developed, Well-nourished, Alert. (-) Distressed


Skin: Warm, Dry


HENT: Normocephalic; Atraumatic


Eyes: Conjunctiva normal


Neck: Musculoskeletal ROM normal neck. (-) JVD, (-) Stridor, (-) Tracheal 

deviation


Cardio: Rhythm regular, rate normal, Heart sounds normal; Intact distal pulses; 

The pedal pulses are 2+ and symmetric. Radial pulses are 2+ and symmetric. (-) 

Murmur


Pulmonary/Chest wall: Effort normal. (-) Respiratory distress, (-) Wheezes, (-) 

Rales


Abd: Soft, (-) Tenderness, (-) Distension, (-) Guarding, (-) Rebound


Musculoskeletal: (-) Edema


Lymph: (-) Cervical adenopathy


Neuro: Alert, Oriented x3


Psych: Mood and affect Normal


 





Triage Information Reviewed: Yes


Vital Signs On Initial Exam: 


 Initial Vitals











Temp Pulse Resp BP Pulse Ox


 


 99.2 F   78   20   155/64   97 


 


 01/16/18 17:30  01/16/18 17:30  01/16/18 17:30  01/16/18 17:30  01/16/18 17:30











Vital Signs Reviewed: Yes





Diagnostics





- Vital Signs


 Vital Signs











  Temp Pulse Resp BP Pulse Ox


 


 01/16/18 17:30  99.2 F  78  20  155/64  97














- Laboratory


Lab Results: 


 Lab Results











  01/16/18 01/16/18 01/16/18 Range/Units





  18:50 18:50 18:50 


 


WBC  8.1    (3.5-10.8)  10^3/ul


 


RBC  4.82    (4.0-5.4)  10^6/ul


 


Hgb  14.7    (14.0-18.0)  g/dl


 


Hct  43    (42-52)  %


 


MCV  88    (80-94)  fL


 


MCH  31    (27-31)  pg


 


MCHC  35    (31-36)  g/dl


 


RDW  15    (10.5-15)  %


 


Plt Count  196    (150-450)  10^3/ul


 


MPV  8    (7.4-10.4)  um3


 


Neut % (Auto)  63.9    (38-83)  %


 


Lymph % (Auto)  25.9    (25-47)  %


 


Mono % (Auto)  8.3    (1-9)  %


 


Eos % (Auto)  1.6    (0-6)  %


 


Baso % (Auto)  0.3    (0-2)  %


 


Absolute Neuts (auto)  5.2    (1.5-7.7)  10^3/ul


 


Absolute Lymphs (auto)  2.1    (1.0-4.8)  10^3/ul


 


Absolute Monos (auto)  0.7    (0-0.8)  10^3/ul


 


Absolute Eos (auto)  0.1    (0-0.6)  10^3/ul


 


Absolute Basos (auto)  0    (0-0.2)  10^3/ul


 


Absolute Nucleated RBC  0    10^3/ul


 


Nucleated RBC %  0    


 


Sodium   138   (133-145)  mmol/L


 


Potassium   4.5   (3.5-5.0)  mmol/L


 


Chloride   105   (101-111)  mmol/L


 


Carbon Dioxide   28   (22-32)  mmol/L


 


Anion Gap   5   (2-11)  mmol/L


 


BUN   19   (6-24)  mg/dL


 


Creatinine   0.82   (0.67-1.17)  mg/dL


 


Est GFR ( Amer)   115.4   (>60)  


 


Est GFR (Non-Af Amer)   89.7   (>60)  


 


BUN/Creatinine Ratio   23.2 H   (8-20)  


 


Glucose   92   ()  mg/dL


 


Hemoglobin A1c     (4.0-5.6)  %


 


Lactic Acid    0.8  (0.5-2.0)  mmol/L


 


Calcium   9.1   (8.6-10.3)  mg/dL


 


Total Bilirubin   0.50   (0.2-1.0)  mg/dL


 


AST   17   (13-39)  U/L


 


ALT   13   (7-52)  U/L


 


Alkaline Phosphatase   46   ()  U/L


 


Troponin I   0.00   (<0.04)  ng/mL


 


Total Protein   6.1 L   (6.4-8.9)  g/dL


 


Albumin   3.7   (3.2-5.2)  g/dL


 


Globulin   2.4   (2-4)  g/dL


 


Albumin/Globulin Ratio   1.5   (1-3)  


 


Triglycerides   125   mg/dL


 


Cholesterol   119   mg/dL


 


LDL Cholesterol   55   mg/dL


 


HDL Cholesterol   39.5   mg/dL














  01/16/18 Range/Units





  18:50 


 


WBC   (3.5-10.8)  10^3/ul


 


RBC   (4.0-5.4)  10^6/ul


 


Hgb   (14.0-18.0)  g/dl


 


Hct   (42-52)  %


 


MCV   (80-94)  fL


 


MCH   (27-31)  pg


 


MCHC   (31-36)  g/dl


 


RDW   (10.5-15)  %


 


Plt Count   (150-450)  10^3/ul


 


MPV   (7.4-10.4)  um3


 


Neut % (Auto)   (38-83)  %


 


Lymph % (Auto)   (25-47)  %


 


Mono % (Auto)   (1-9)  %


 


Eos % (Auto)   (0-6)  %


 


Baso % (Auto)   (0-2)  %


 


Absolute Neuts (auto)   (1.5-7.7)  10^3/ul


 


Absolute Lymphs (auto)   (1.0-4.8)  10^3/ul


 


Absolute Monos (auto)   (0-0.8)  10^3/ul


 


Absolute Eos (auto)   (0-0.6)  10^3/ul


 


Absolute Basos (auto)   (0-0.2)  10^3/ul


 


Absolute Nucleated RBC   10^3/ul


 


Nucleated RBC %   


 


Sodium   (133-145)  mmol/L


 


Potassium   (3.5-5.0)  mmol/L


 


Chloride   (101-111)  mmol/L


 


Carbon Dioxide   (22-32)  mmol/L


 


Anion Gap   (2-11)  mmol/L


 


BUN   (6-24)  mg/dL


 


Creatinine   (0.67-1.17)  mg/dL


 


Est GFR ( Amer)   (>60)  


 


Est GFR (Non-Af Amer)   (>60)  


 


BUN/Creatinine Ratio   (8-20)  


 


Glucose   ()  mg/dL


 


Hemoglobin A1c  5.9 H  (4.0-5.6)  %


 


Lactic Acid   (0.5-2.0)  mmol/L


 


Calcium   (8.6-10.3)  mg/dL


 


Total Bilirubin   (0.2-1.0)  mg/dL


 


AST   (13-39)  U/L


 


ALT   (7-52)  U/L


 


Alkaline Phosphatase   ()  U/L


 


Troponin I   (<0.04)  ng/mL


 


Total Protein   (6.4-8.9)  g/dL


 


Albumin   (3.2-5.2)  g/dL


 


Globulin   (2-4)  g/dL


 


Albumin/Globulin Ratio   (1-3)  


 


Triglycerides   mg/dL


 


Cholesterol   mg/dL


 


LDL Cholesterol   mg/dL


 


HDL Cholesterol   mg/dL











Result Diagrams: 


 01/17/18 05:39





 01/17/18 05:39


Lab Statement: Any lab studies that have been ordered have been reviewed, and 

results considered in the medical decision making process.





- Radiology


  ** CXR


Radiology Interpretation Completed By: Radiologist - No radiographic evidence 

for acute cardiopulmonary abnormality on this portable chest x-ray.  ED 

physician has reviewed this radiology report.





- EKG


  ** 17:34


Cardiac Rate: NL


EKG Rhythm: Sinus Rhythm - at 75 BPM


EKG Interpretation: No STEMI 





Re-Evaluation





- Re-Evaluation


  ** First Eval


Re-Evaluation Time: 20:00


Change: Improved


Comment: The patient reports no pain.





Chest Pain Course/Dx





- Course


Assessment/Plan: This patient is a 83 year old M presenting to [facility] 

accompanied by his wife with a chief complaint of CP since this afternoon. The 

patient rates the pain 3/10 in severity and describes it as a heavy heart. 

Patient reports elevated blood pressure and palpitations. Patient denies 

radiation of pain, LE pain, Le edema and cough. Patient has a history of HLD 

and HTN with a family history of CAD.  CXR reveals, per radiologist, No 

radiographic evidence for acute cardiopulmonary abnormality on this.  portable 

chest x-ray.  Test results with no significant abnormalities.  In the ED course 

the patient was given NTG and ASA.  We discussed patient care with Dr. Frankenberg and he agreed to admit the patient.  Patient will be admitted.  The 

patient is agreeable with this plan.





- Diagnoses


Provider Diagnoses: 


 Chest pain, unspecified








- Provider Notifications


Discussed Care Of Patient With: Fred Frankenberg


Time Discussed With Above Provider: 20:03


Instructed by Provider To: Admit As Inpatient





Discharge





- Discharge Plan


Condition: Stable


Disposition: ADMITTED TO [facility]





The documentation as recorded by the Hawa knight Gabriel accurately reflects 

the service I personally performed and the decisions made by me, Esau Patel MD.

## 2018-04-28 ENCOUNTER — HOSPITAL ENCOUNTER (EMERGENCY)
Dept: HOSPITAL 25 - ED | Age: 83
Discharge: HOME | End: 2018-04-28
Payer: MEDICARE
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
## 2018-04-28 DIAGNOSIS — S61.412A: Primary | ICD-10-CM

## 2018-04-28 DIAGNOSIS — Z23: ICD-10-CM

## 2018-04-28 DIAGNOSIS — J45.909: ICD-10-CM

## 2018-04-28 DIAGNOSIS — Z96.653: ICD-10-CM

## 2018-04-28 DIAGNOSIS — I20.9: ICD-10-CM

## 2018-04-28 DIAGNOSIS — E78.00: ICD-10-CM

## 2018-04-28 DIAGNOSIS — Z87.891: ICD-10-CM

## 2018-04-28 DIAGNOSIS — F32.9: ICD-10-CM

## 2018-04-28 DIAGNOSIS — I10: ICD-10-CM
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
## 2018-04-28 PROCEDURE — 12001 RPR S/N/AX/GEN/TRNK 2.5CM/<: CPT

## 2018-04-28 PROCEDURE — 99282 EMERGENCY DEPT VISIT SF MDM: CPT

## 2018-04-28 PROCEDURE — 90471 IMMUNIZATION ADMIN: CPT

## 2018-04-28 PROCEDURE — 90715 TDAP VACCINE 7 YRS/> IM: CPT

## 2018-04-28 NOTE — XMS REPORT
Caiocarole Balbuena

 Created on:2018



Patient:Caio Balbuena

Sex:Male

:1934

External Reference #:[id number]





Demographics







 Address  [address] [place] [address]

 

 Home Phone  [phone]

 

 Mobile Phone  [phone]

 

 Preferred Language  English

 

 Marital Status  Declined to Specify/Unknown

 

 [employer] Affiliation  Unknown

 

 Race  Unknown

 

 Ethnic Group  Declined to Specify/Unknown









Author







 Organization  [facility]

 

 Address  [address] [place] [address]

 

 Phone  [phone]









Support







 Name  Relationship  Address  Phone

 

 Estrella Alves  Wife  Unavailable  [phone]









Care Team Providers







 Name  Role  Phone

 

 Janie Gan M.D.  Care Team Information   Unavailable









Payers







 Type  Date  Identification Numbers  Payment Provider  Subscriber

 

 Medicare Primary    Policy Number: [id number]  Medicare - [facility]  Caio 
Sree









 PayID: [id number]  [address]









 [place] [address]









 Medigap Part B    Policy Number: [id number]   For Life  Caio Balbuena









 PayID: [id number]  [address]









 [place] [address]







Problems







 Date  Description  Provider  Status

 

 Onset: 2015  Essential hypertension  Rickey Brown MD  Active

 

 Onset: 2015  Depressive disorder  Rickey Brown MD  Active

 

 Onset: 2015  Asthma  Rickey Brown MD  Active

 

 Onset: 2015  Chronic sinusitis  Rickey Brown MD  Active







Family History







 Date  Family Member(s)  Problem(s)  Comments

 

   Father  Heart Disease  

 

   Father  Cancer, Unknown Site  

 

   Mother  Heart Disease  

 

   Mother  Cancer, Unknown Site  







Social History







 Type  Date  Description  Comments

 

 Marital Status      Remarried.

 

 Lives With    Wife  

 

 Occupation    Retired  

 

 Hobbies    Sailing, boats  

 

 Hobbies    yard work  

 

 Cigarette Use    Former Cigarette Smoker  

 

 ETOH Use    Occasionally consumes wine  1 glass of wine nightly

 

 Recreational Drug Use    Never Used Drugs  

 

 Smoking    Patient is a former smoker  Quit 40 yrs ago. Smoked for



       22 years less than a pack a



       day

 

 Exercise Type/Frequency    Exercises regularly  







Allergies, Adverse Reactions, Alerts







 Description

 

 No Information







Medications







 Medication  Date  Status  Form  Strength  Qnty  SIG  Indications  Ordering



                 Provider

 

 Crestor  /  Active  Tablets  10mg  30tab  1 po qd    [facility]



           s      Shortle,



                 NP

 

 Levothyroxine  /  Active  Tablets  25mcg  30tab  1 po qd    [facility]



 Sodium          s      Shortle,



                 NP

 

 Metoprolol  /  Active  Tablets  50mg  60tab  take one    [facility]



 Tartrate          s  tablet by    Shortle,



             mouth    NP



             twice a    



             day    

 

 Fluticasone  /  Active  Suspension  50mcg/Act  16gm  1 spray    [facility]



 Propionate            into each    Shortle,



             nostril    NP



             once daily    

 

 Losartan  /  Active  Tablets  50mg  30tab  1 by mouth    [facility]



 Potassium          s  every day    Shortle,



                 NP

 

 Nitroglycerin  /  Active  Tablets Sub  0.4mg  30tab  1 tab sl q    
[facility]



           s  5 minutes    Shortle,



             prn angina    NP

 

 Proventil HFA  /  Active  Aerosol  108(90Bas  6.700  2 puffs q    [facility]



         e)  gm  4 hours    Shortle,



         mcg/Act    prn SOB,    NP



             wheezing    

 

 Aspirin Adult  /  Active  Tablets DR  81mg  30tab  1 po qd    [facility]



 Low Dose  2018        s      Shortle,



                 NP

 

 Sertraline HCL  /  Active  Tablets  100mg  30tab  1 po qd    [facility]



           s      Shortle,



                 NP

 

 Multi Vitamin  /  Active  Tablets      1 by mouth    Unknown



 Daily  0000          every day    

 

                 

 

 Azithromycin  /  Hx  Tablets  250mg  6tabs  2 tabs  J06.9  [facility]



    -          today. 1    Shortle,



   /          tab daily    NP



             for the    



             following    



             4 days.    

 

 Micardis  /  Hx  Tablets  40mg  30tab  1 po qd    [facility]



    -        s      Shortle,



   /              NP



   2018              

 

 Micardis  /  Hx  Tablets  80mg    take 1/3    Rickey



    -          tablet    [facility]



   /          daily    , MD



   2018              

 

 Crestor  /00/  Hx            Unknown



    -              



   2018              

 

 Aspirin Adult  //  Hx            Unknown



 Low Dose   -              



   2018              

 

 Nasonex  /00/  Hx            Unknown



    -              



   2018              

 

 Proventil HFA  /  Hx            Unknown



    -              



   2018              

 

 Metoprolol  /  Hx  Tablets ER  50mg    1 by mouth    Unknown



 Succinate ER  0000 -    24HR      every day    



   2018              

 

 Levothyroxine  00/00/  Hx            Unknown



 Sodium  0000 -              



   2018              







Immunizations







 CPT Code  Status  Date  Vaccine  Lot #

 

 [id number]  Given  2018  Prevnar-13 Pneumococcal Conjugate Vaccine  







Vital Signs







 Date  Vital  Result  Comment

 

 2018  Weight  226.00 lb  









 Weight in kg's  102.514  

 

 BP Systolic  110 mmHg  

 

 BP Diastolic  60 mmHg  

 

 Heart Rate  64 /min  

 

 Body Temperature  97.9 F  

 

 Respiratory Rate  16 /min  

 

 Height  66 inches  5'6"

 

 BMI (Body Mass Index)  36.5 kg/m2  

 

 O2 % BldC Oximetry  94 %  96









 2018  Weight  233.00 lb  









 Weight in kg's  105.689  

 

 BP Systolic  130 mmHg  

 

 BP Diastolic  60 mmHg  

 

 Heart Rate  84 /min  

 

 Body Temperature  98.3 F  

 

 Respiratory Rate  16 /min  

 

 O2 % BldC Oximetry  97 %  









 2015  Weight  226.00 lb  









 Weight in kg's  102.514  

 

 BP Systolic  130 mmHg  

 

 BP Diastolic  64 mmHg  

 

 Heart Rate  88 /min  

 

 Height  65.5 inches  5'5.50"

 

 BMI (Body Mass Index)  37.0 kg/m2  







Results







 Description

 

 No Information







Procedures







 Description

 

 No Information







Encounters







 Type  Date  Location  Provider  CPT E/M  Dx

 

 Office Visit  2015 11:00a  Main Office  Rickey Brown MD  [address]  I10







Plan of Care

Future Appointment(s):2018  8:00 am - Sienna Pepe NP at Main 
[id number]  8:20 am - [facility] at Main [id number] - 
Sienna Pepe, NPR26.81 Unsteadiness on feetNew Therapy:Physical Therapy-
Evaluate And TreatComments:No acute concerns today.Discussed exercises for 
balance and physical therapy. Patient will be referred to physical 
therapyFollow up:Refer to Fabricio Mitchell Physical therapy for balance yzpqbjH00.9 
Hypothyroidism, unspecifiedNew Labs:CBC Auto DiffComp Metabolic PanelTSH (
Thyroid Stim Horm)Comments:Will check TSH to assess No other concernsFollow up:
Schedule physical in a few months to allow for time for wiuyzzxC81 Essential (
primary) hypertensionComments:BP within normal limits todayTaking medications 
regularlyCBC and CMP ordered for bydbtmbauyQ05.4 Other hyperlipidemiaNew Labs:
Lipid Profile (Trig/Chol/HDL)Liver Function PanelComments:Takes Crestor 10 mg 
Lipids and liver function tests orderedFollow up:schedule fasting labs nurse 
sezrwH82.1 Major depressive disorder, recurrent, moderateComments:Reports 
symptoms are well controlled on Sertraline 100 mg. No rxommiL21.5 Encounter for 
screening for malignant neoplasm of prostateNew Labs:PSA ScreeningComments:
Discussed ordering labs and patient requests PSA be ordered for screening. 
TbmonlhourhvZ13.9 Acute upper respiratory infection, unspecifiedComments:
Reports symptoms are improving. Patient has resp illness cyclically and we 
discussed allergy referral.Patient will be referred to allergist for testing 
and further evaluation.Follow up:Also refer to allergist for allergy testing

## 2018-04-28 NOTE — XMS REPORT
Everette Balbuena

 Created on:April 3, 2018



Patient:Everette Balbuena

Sex:Male

:1934

External Reference #:[id number]





Demographics







 Address  [address] [place] [address]

 

 Home Phone  [phone]

 

 Mobile Phone  [phone]

 

 Email Address  [email or web address]

 

 Preferred Language  Unknown

 

 Marital Status  Not  Or 

 

 [employer] Affiliation  Unknown

 

 Race  Unknown

 

 Ethnic Group  Not  Or 









Author







 Organization  [facility]

 

 Address  [address] [address]

 

 Phone  [phone]









Support







 Name  Relationship  Address  Phone

 

 Estrella Rasmussen  Unavailable  Unavailable  [phone]









Care Team Providers







 Name  Role  Phone

 

 Mera Davila MD  Primary Care Physician  Unavailable









Payers







 Type  Date  Identification Numbers  Payment Provider  Subscriber

 

 Medicare Primary    Policy Number: [id number]  Medicare  Everette Balbuena









 PayID: [id number]  [address]









 [place] [address]









 [facility]    Policy Number: [id number]   For Life  Everette Balbuena









 PayID: [id number]  [address]









 [place] [address]







Problems







 Date  Description  Provider  Status

 

 Onset: 2015  Degeneration of lumbar intervertebral  Tima Cortez M.D.  
Active



   disc    

 

 Onset: 2016  Obstructive sleep apnea syndrome  Li Hudson MD  Active

 

 Onset: 2016  Obesity  Li Hudson MD  Active

 

 Onset: 2016  Allergic rhinitis  Li Hudson MD  Active







Family History







 Date  Family Member(s)  Problem(s)  Comments

 

   General  Colon Cancer  

 

   General  Heart Disease  

 

   Father  Heart Disease  

 

   Father   due to Heart Disease  ()

 

   Mother  Cancer  

 

   Mother   due to Colon Cancer  ()

 

   Siblings  1  

 

   Siblings  1   during War







Social History







 Type  Date  Description  Comments

 

 Marital Status      

 

 Lives With    Wife  

 

 Occupation    Retired  administartor

 

 Cigarette Use    Quit in   

 

 ETOH Use    Occasionally consumes  



     alcohol  

 

 Smoking    Patient is a former smoker  

 

 Recreational Drug Use    Denies Drug Use  

 

 Smoking    Heavy tobacco smoker (more  1PPD x 6 years



     than 10 cigarettes/day)  

 

 Daily Caffeine    Does Not Consume Caffeine  

 

 Exercise Type/Frequency    Exercises regularly  3 day/week at [employer]







Allergies, Adverse Reactions, Alerts







 Date  Description  Reaction  Status  Severity  Comments

 

 2015  NKDA    active    







Medications







 Medication  Date  Status  Form  Strength  Qnty  SIG  Indications  Ordering



                 Provider

 

 Crestor  11/15/  Active  Tablets  10mg    1 by    Unknown



   2016          mouth    



             every day    

 

 Ibuprofen  11/15/  Active  Tablets      as needed    Unknown



                 

 

 Sertraline HCL  /  Active  Tablets  100mg    1 by    Unknown



   0000          mouth    



             every day    

 

 Aspir-81  /  Active  Tablets DR  81mg    1 by    Unknown



   0000          mouth    



             every day    

 

 Celadrin  /  Active  Tablet      1 tab by    Unknown



   0000          mouth    



             daily    

 

 Levothyroxine  /  Active  Tablets  25mcg    1 by    Unknown



 Sodium  0000          mouth    



             every day    

 

 Nitrostat  /  Active  Tablets Sub  0.4mg    one sl    Unknown



   0000          q5min up    



             to 3    



             doses as    



             needed    

 

 Multi Vitamin  /  Active  Tablets      1 by    Unknown



   0000          mouth    



             every day    

 

 Metoprolol  /  Active  Tablets ER  50mg    1 by    Unknown



 Succinate ER  0000    24HR      mouth    



             every day    

 

 Zypan  /  Active        2 po qam    Unknown



   0000          1 po qpm    

 

 Enzycore  /  Active  Capsules      2 po qam    Unknown



   0000          1 po qpm    

 

 Cpap  /  Active  Device      for use    Unknown



   0000          while    



             sleeping    

 

 Fluticasone  /  Active  Suspension  50mcg/Act    1 spray    Rigoberto,



 Propionate  0000          each    MD Mera



             nostril    



             once    



             daily at    



             bedtime    

 

 Mucinex  /  Active  Tablets ER  600mg    1 tablet    Unknown



   0000    12HR      po daily    

 

                 

 

 Fluticasone  /  Hx  Suspension  50mcg/Act  32gm  2 sprays  J30.9  Li



 Propionate   -          each    Tristan



   /          nostril    MD



             everyday    

 

 Proventil HFA  11/15/  Hx  Aerosol  108(90Base    2 puffs    Unknown



    -      ) mcg/Act    by mouth    



   /          every 4    



   2018          hours    

 

 Micardis  /  Hx  Tablets  80mg    1 by    Unknown



   0000 -          mouth    



   /          every day    



                 

 

 Nasonex  /  Hx  Suspension  50mcg/Act    2 sprays    Unknown



   0000 -          to each    



   /          nostril    



   2018          twice    



             daily    

 

 Losartan  /  Hx  Tablets  50mg    1 by    Unknown



 Potassium  0000 -          mouth    



   /          every day    



   2018              

 

 Metoprolol  /  Hx  Tablets  50mg    1 by    Unknown



 Tartrate  0000 -          mouth    



   /          twice a    



   2018          day    

 

 Metoprolol  /  Hx  Tablets  50mg    1 by    Unknown



 Tartrate  0000 -          mouth    



   /          twice a    



   2018          day    







Vital Signs







 Date  Vital  Result  Comment

 

 2018  Height  67 inches  5'7"









 Weight  234.25 lb  with shoes

 

 Heart Rate  80 /min  

 

 BP Systolic Sitting  130 mmHg  L/A reg cuff

 

 BP Diastolic Sitting  58 mmHg  L/A reg cuff

 

 BP Systolic Standing  128 mmHg  L/A reg cuff

 

 BP Diastolic Standing  58 mmHg  L/A reg cuff

 

 BMI (Body Mass Index)  36.7 kg/m2  

 

 Ejection Fraction  60%-65%  echo 2018  Height  67 inches  5'7"









 Weight  229.50 lb  

 

 Heart Rate  82 /min  

 

 BP Systolic Sitting  120 mmHg  LA, reg

 

 BP Diastolic Sitting  60 mmHg  LA, reg

 

 BMI (Body Mass Index)  35.9 kg/m2  









 2016  Height  67 inches  5'7"









 Weight  210.00 lb  

 

 Heart Rate  76 /min  

 

 BP Systolic Sitting  128 mmHg  

 

 BP Diastolic Sitting  76 mmHg  

 

 Respiratory Rate  16 /min  

 

 O2 % BldC Oximetry  96 %  

 

 BMI (Body Mass Index)  32.9 kg/m2  

 

 Neck Circumference in inches  19.25  









 2015  Height  67 inches  5'7"









 Weight  225.00 lb  

 

 Heart Rate  66 /min  

 

 BP Systolic Sitting  130 mmHg  

 

 BP Diastolic Sitting  82 mmHg  

 

 Pain Level  4  back

 

 BMI (Body Mass Index)  35.2 kg/m2  







Results







 Description

 

 No Information







Procedures







 Date  CPT Code  Description  Status

 

 2018  99450  ECHO Transthoracic, Real-Time 2D With Doppler And Color  
Completed



     Flow  

 

 2018  08181  ECHO Transthoracic, Real-Time 2D With Doppler And Color  
Completed



     Flow  

 

 2018  82596  Holter Monitor Review (24 hr)dr review &amp; interp  
Completed



     only  

 

 2018  78460  EKG Tracing &amp; Interpretation  Completed

 

 2018  46613  Treadmill Interp/Report Only  Completed

 

 2018  12295  Stress Test Supervsn W/Out I/R  Completed

 

 2016  98873  Polysomnography Sleep Staging 4+ Parameters W/Cpap  
Completed







Encounters







 Type  Date  Location  Provider  CPT E/M  Dx

 

 Office Visit  2018  3:00p  [facility]  taybeh S. Maghaydah,  
[address]  I49.3



       M.DDada    









 R00.2

 

 E78.5

 

 I10

 

 G47.33

 

 I71.9









 [facility]  [facility]  [facility]20p  [facility]  
[address]  R07.9



       M.D.    









 R00.2

 

 E78.5

 

 I10

 

 R94.31









 Office Visit  2018  7:27a  [facility]  JAVID Tobar  
[address]  R07.9



     Hospitalists      









 E78.5

 

 F33.2

 

 I10









 Office Visit  2016  8:45a  [facility]  Li Hudson MD  
[address]  G47.33



     [facility]      









 J30.9

 

 E66.09

 

 Z68.32









 Office Visit  2015 10:00a  [facility]  Tima Cortez,  [address]  
722.52



     [facility]    







Plan of Care

2018 - Qutaybeh S. Maghaydah, M.D.I49.3 Ventricular premature 
depolarizationNew Orders:24 hour holter monitorFollow up:6 months Roxanne one yr 
ov with meR00.2 SuzyjsoinfgsF97.5 Hyperlipidemia, mxawvtypoleG50 Essential (
primary) auktswpvwzgdI15.33 Obstructive sleep apnea (adult) (pediatric)I71.9 
Aortic aneurysm of unspecified site, without rupture

## 2018-04-28 NOTE — XMS REPORT
Caiocarole Balbuena

 Created on:2018



Patient:Caio Balbuena

Sex:Male

:1934

External Reference #:[id number]





Demographics







 Address  [address] [place] [address]

 

 Home Phone  [phone]

 

 Mobile Phone  [phone]

 

 Preferred Language  English

 

 Marital Status  Declined to Specify/Unknown

 

 [employer] Affiliation  Unknown

 

 Race  Unknown

 

 Ethnic Group  Declined to Specify/Unknown









Author







 Organization  [facility]

 

 Address  [address] [place] [address]

 

 Phone  [phone]









Support







 Name  Relationship  Address  Phone

 

 Estrella Alves  Wife  Unavailable  [phone]









Care Team Providers







 Name  Role  Phone

 

 Janie Gan M.D.  Care Team Information   Unavailable









Payers







 Type  Date  Identification Numbers  Payment Provider  Subscriber

 

 Medicare Primary    Policy Number: [id number]  Medicare - [facility]  Caio 
Sree









 PayID: [id number]  [address]









 [place] [address]









 Medigap Part B    Policy Number: [id number]   For Life  Caio Balbuena









 PayID: [id number]  [address]









 [place] [address]







Problems







 Date  Description  Provider  Status

 

 Onset: 2015  Essential hypertension  Rickey Brown MD  Active

 

 Onset: 2015  Depressive disorder  Rickey Brown MD  Active

 

 Onset: 2015  Asthma  Rickey Brown MD  Active

 

 Onset: 2015  Chronic sinusitis  Rickey Brown MD  Active







Family History







 Date  Family Member(s)  Problem(s)  Comments

 

   Father  Heart Disease  

 

   Father  Cancer, Unknown Site  

 

   Mother  Heart Disease  

 

   Mother  Cancer, Unknown Site  







Social History







 Type  Date  Description  Comments

 

 Marital Status      Remarried.

 

 Lives With    Wife  

 

 Occupation    Retired  

 

 Hobbies    Sailing, boats  

 

 Hobbies    yard work  

 

 Cigarette Use    Never Smoked Cigarettes  

 

 ETOH Use    Occasionally consumes wine  1 glass of wine nightly

 

 Recreational Drug Use    Never Used Drugs  

 

 Exercise Type/Frequency    Exercises regularly  







Allergies, Adverse Reactions, Alerts







 Description

 

 No Information







Medications







 Medication  Date  Status  Form  Strength  Qnty  SIG  Indications  Ordering



                 Provider

 

 Azithromycin  /  Active  Tablets  250mg  6tabs  2 tabs  J06.9  Sienna



             today. 1    Shortle,



             tab daily    NP



             for the    



             following    



             4 days.    

 

 Crestor  /  Active  Tablets  10mg  30tab  1 po qd    [facility]



           s      Shortle,



                 NP

 

 Levothyroxine  /  Active  Tablets  25mcg  30tab  1 po qd    [facility]



 Sodium          s      Shortle,



                 NP

 

 Metoprolol  /  Active  Tablets  50mg  60tab  take one    [facility]



 Tartrate          s  tablet by    Shortle,



             mouth    NP



             twice a    



             day    

 

 Micardis  /  Active  Tablets  40mg  30tab  1 po qd    [facility]



           s      Shortle,



                 NP

 

 Fluticasone  /  Active  Suspension  50mcg/Act  16gm  1 spray    [facility]



 Propionate            into each    Shortle,



             nostril    NP



             once daily    

 

 Losartan  /  Active  Tablets  50mg  30tab  1 by mouth    [facility]



 Potassium          s  every day    Shortle,



                 NP

 

 Nitroglycerin  /  Active  Tablets Sub  0.4mg  30tab  1 tab sl q    
[facility]



           s  5 minutes    Shortle,



             prn angina    NP

 

 Proventil HFA  /  Active  Aerosol  108(90Bas  6.700  2 puffs q    [facility]



         e)  gm  4 hours    Shortle,



         mcg/Act    prn SOB,    NP



             wheezing    

 

 Aspirin Adult  /  Active  Tablets DR  81mg  30tab  1 po qd    [facility]



 Low Dose          s      Shortle,



                 NP

 

 Sertraline HCL  /  Active  Tablets  100mg  30tab  1 po qd    [facility]



           s      Shortle,



                 NP

 

 Multi Vitamin  /  Active  Tablets      1 by mouth    Unknown



 Daily  0000          every day    

 

                 

 

 Micardis  /  Hx  Tablets  80mg    take 1/3    Rickey



   2015 -          tablet    Headriana



   /          daily    , MD



   2018              

 

 Crestor  00/00/  Hx            Unknown



    -              



   2018              

 

 Aspirin Adult  /00/  Hx            Unknown



 Low Dose  2018              

 

 Nasonex  00/00/  Hx            Unknown



    -              



   2018              

 

 Proventil HFA  00/  Hx            Unknown



    -              



   2018              

 

 Metoprolol  /  Hx  Tablets ER  50mg    1 by mouth    Unknown



 Succinate ER  0000 -    24HR      every day    



   2018              

 

 Levothyroxine  00/00/  Hx            Unknown



 Sodium   -              



   2018              







Immunizations







 CPT Code  Status  Date  Vaccine  Lot #

 

 [id number]  Given  2018  Prevnar-13 Pneumococcal Conjugate Vaccine  







Vital Signs







 Date  Vital  Result  Comment

 

 2018  Weight  233.00 lb  









 Weight in kg's  105.689  

 

 BP Systolic  130 mmHg  

 

 BP Diastolic  60 mmHg  

 

 Heart Rate  84 /min  

 

 Body Temperature  98.3 F  

 

 Respiratory Rate  16 /min  

 

 O2 % BldC Oximetry  97 %  









 2015  Weight  226.00 lb  









 Weight in kg's  102.514  

 

 BP Systolic  130 mmHg  

 

 BP Diastolic  64 mmHg  

 

 Heart Rate  88 /min  

 

 Height  65.5 inches  5'5.50"

 

 BMI (Body Mass Index)  37.0 kg/m2  







Results







 Description

 

 No Information







Procedures







 Description

 

 No Information







Encounters







 Type  Date  Location  Provider  CPT E/M  Dx

 

 Office Visit  2015 11:00a  Main Office  Rickey Brown MD  [address]  I10







Plan of Care

Future Appointment(s):2018  2:30 pm - Sienna Pepe NP at Main 
[id number] - Sienna Pepe NPJ06.9 Acute upper respiratory infection, 
unspecifiedNew Medication:Azithromycin 250 mgComments:No acute concerns 
today.Discussed Xray which patient declined. Due to duration and double 
worsening,abx ordered. Instructed on proper use of rescue inhalerEducated on 
supportive careEducated on new/worsening symptoms and when to call/return. 
Patient stated understanding and agrees to planI10 Essential (primary) 
hypertensionComments:Patient is transferring to our office for care and is 
requesting refills on medications.Will ykfxmvm13 days and in the meantime 
patient will need physical.E03.9 Hypothyroidism, unspecifiedComments:As above

## 2018-04-28 NOTE — ED
Laceration/Wound HPI





- HPI Summary


HPI Summary: 


Patient is an 83-year-old male who presents emergency department for laceration 

to his left hand that occurred just prior to arrival.  Patient states he was 

walking up the steps to his house wearing slippers when he tripped on the steps

, fell and struck his hand on a fence.  Denies striking his head or loss of 

consciousness.  He is not anticoagulated.  He is unaware of his last tetanus 

immunization.  Symptoms are mild in severity.  Touching the affected area makes 

symptoms worse.  Rest makes symptoms better.








- History of Current Complaint


Stated Complaint: RT HAND LAC


Time Seen by Provider: 04/28/18 10:46


Hx Obtained From: Patient, Family/Caretaker


Pain Intensity: 3


Pain Scale Used: 0-10 Numeric





- Allergy/Home Medications


Allergies/Adverse Reactions: 


 Allergies











Allergy/AdvReac Type Severity Reaction Status Date / Time


 


No Known Allergies Allergy   Verified 04/28/18 10:38














PMH/Surg Hx/FS Hx/Imm Hx


Previously Healthy: Yes


Endocrine/Hematology History: 


   Denies: Hx Diabetes, Hx Thyroid Disease


Cardiovascular History: Reports: Hx Angina, Hx Hypercholesterolemia, Hx 

Hypertension - meds


   Denies: Hx Pacemaker/ICD


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


   Denies: Hx Chronic Obstructive Pulmonary Disease (COPD)


GI History: 


   Denies: Hx Ulcer


 History: 


   Denies: Hx Renal Disease


Sensory History: Reports: Hx Contacts or Glasses


   Denies: Hx Hearing Aid


Opthamlomology History: Reports: Hx Contacts or Glasses


Psychiatric History: Reports: Hx Depression


   Denies: Hx Panic Disorder





- Cancer History


Cancer Type, Location and Year: Basal cell removed from forehead 2012





- Surgical History


Surgery Procedure, Year, and Place: Bilateral Knees- REPLACEMENTS, [facility],

.  SINUS


Infectious Disease History: No


Infectious Disease History: 


   Denies: Hx Clostridium Difficile, Hx Hepatitis, Hx Human Immunodeficiency 

Virus (HIV), Hx of Known/Suspected MRSA, Hx Shingles, Hx Tuberculosis, Hx Known/

Suspected VRE, Hx Known/Suspected VRSA, History Other Infectious Disease, 

Traveled Outside the [place] in Last 30 Days





- Family History


Known Family History: Positive: Cardiac Disease - father , Hypertension





- Social History


Occupation: Retired


Lives: With Family


Alcohol Use: Occasionally


Alcohol Amount: Glass of wine


Hx Substance Use: No


Substance Use Type: Reports: None


Hx Tobacco Use: Yes


Smoking Status (MU): Former Smoker


Type: Cigarettes


Amount Used/How Often: 1 PPD


Length of Time of Smoking/Using Tobacco: 5-6 years


Have You Smoked in the Last Year: No





Review of Systems


Positive: Other - laceration to left hand


All Other Systems Reviewed And Are Negative: Yes





Physical Exam


Triage Information Reviewed: Yes


Vital Signs On Initial Exam: 


 Initial Vitals











Temp Pulse Resp BP Pulse Ox


 


 97.3 F   64   15   114/51   96 


 


 04/28/18 10:27  04/28/18 10:27  04/28/18 10:27  04/28/18 10:27  04/28/18 10:27











Vital Signs Reviewed: Yes


Appearance: Positive: Well-Appearing - Patient sitting in bed in no acute 

distress.  Very talkative.  Pleasant.  Significant other present.


Skin: Positive: Warm, Dry


Head/Face: Positive: Normal Head/Face Inspection


Eyes: Positive: Normal


Musculoskeletal: Positive: Other - 1.5cm cresent shaped full thickness 

laceration noted over the left thenar eminence. No muscle exposured. No bony 

tenderness. Tendons are intact.





Procedures





- Laceration/Wound Repair


  ** 1


Location: upper extremity - Left hand


Anesthesia: Local, 1.0%, Lido


Length, Depth and Shape: 1.5cm cresent shape. Full thickness into adipose tissue


Betadine Prep?: No - Hibiclens used


Irrigated w/ Saline (ccs): 500 - o


Laceration/Wound Explored: clean, no foreign body removed


Closure: Single Layer


Suture Type: Nylon


Number of Sutures: 4


Layer Closure?: No


Sterile Dressing Applied?: Yes





Diagnostics





- Vital Signs


 Vital Signs











  Temp Pulse Resp BP Pulse Ox


 


 04/28/18 12:11  0 F  0  18  00/00  0


 


 04/28/18 10:27  97.3 F  64  15  114/51  96














- Laboratory


Lab Statement: Any lab studies that have been ordered have been reviewed, and 

results considered in the medical decision making process.





Laceration Repair Course/Dx





- Course


Course Of Treatment: Pt. presenting to the ER for a simple hand laceration. 

Tetanus was updated. Wound was extensively irrigated, cleaned and repaired as 

noted. Will prophylactically cover with Keflex given mechanism and potential 

for infection.  Advised suture removal in 7-10 days.  Keep the wound clean and 

dry.  To return here for redness, swelling or drainage from wound.  Patient 

understands and agrees with plan.





- Differential Dx


Differental Diagnoses: Foreign Body, Joint Infection, Puncture Wound, Tendon 

Laceration





- Clinical Impression


Provider Diagnoses: 


 Hand laceration








Discharge





- Sign-Out/Discharge


Documenting (check all that apply): Discharge/Admit/Transfer





- Discharge Plan


Condition: Good


Disposition: HOME


Prescriptions: 


Cephalexin CAP* [Keflex CAP*] 500 mg PO BID 10 Days #20 cap


Patient Education Materials:  Care For Your Stitches (ED)


Referrals: 


Janie Gan MD [Primary Care Provider] - 


Additional Instructions: 


Suture removal in 7-10 days


Keep wound clean and dry


Keflex as directed


Return to ER for redness, swelling or drainage from wound 





- Billing Disposition and Condition


Condition: GOOD


Disposition: HOME

## 2018-06-24 ENCOUNTER — HOSPITAL ENCOUNTER (EMERGENCY)
Dept: HOSPITAL 25 - UCEAST | Age: 83
Discharge: HOME | End: 2018-06-24
Payer: MEDICARE

## 2018-06-24 VITALS — SYSTOLIC BLOOD PRESSURE: 112 MMHG | DIASTOLIC BLOOD PRESSURE: 56 MMHG

## 2018-06-24 DIAGNOSIS — I10: ICD-10-CM

## 2018-06-24 DIAGNOSIS — Z87.891: ICD-10-CM

## 2018-06-24 DIAGNOSIS — J40: Primary | ICD-10-CM

## 2018-06-24 PROCEDURE — 99212 OFFICE O/P EST SF 10 MIN: CPT

## 2018-06-24 PROCEDURE — G0463 HOSPITAL OUTPT CLINIC VISIT: HCPCS

## 2018-06-24 NOTE — UC
Respiratory Complaint HPI





- HPI Summary


HPI Summary: 





83 yo male presents with productive cough and chest congestion for 1 week 

getting progressively worse. He tells me that for the last few years he will 

get PNA once or twice a year. He is currently undergoing testing with an 

allergist and his PCP for this. Denies fever, chills, sore throat, SOB, chest 

pain.





- History of Current Complaint


Hx Obtained From: Patient


Onset/Duration: Gradual Onset


Severity Currently: None


Pain Intensity: 0


Character: Cough: Productive





<Landen Betancourt - Last Filed: 06/24/18 11:25>





<Tima Street - Last Filed: 06/24/18 13:53>





- History of Current Complaint


Chief Complaint: UCRespiratory


Stated Complaint: CHEST CONGESTION


Time Seen by Provider: 06/24/18 10:52





- Allergies/Home Medications


Allergies/Adverse Reactions: 


 Allergies











Allergy/AdvReac Type Severity Reaction Status Date / Time


 


No Known Allergies Allergy   Verified 06/24/18 10:45














PMH/Surg Hx/FS Hx/Imm Hx


Endocrine History: Hypothyroidism


Cardiovascular History: Cardiac Disease, Hypertension


Psychological History: Depression





- Surgical History


Surgical History: Yes


Surgery Procedure, Year, and Place: Bilateral Knees- REPLACEMENTS, [facility],

.  SINUS





- Family History


Known Family History: Positive: Cardiac Disease - father , Hypertension





- Social History


Occupation: Retired


Lives: With Family


Alcohol Use: Occasionally


Alcohol Amount: Glass of wine


Substance Use Type: None


Smoking Status (MU): Former Smoker


Type: Cigarettes


Amount Used/How Often: 1 PPD


Length of Time of Smoking/Using Tobacco: 5-6 years


Have You Smoked in the Last Year: No





- Immunization History


Most Recent Influenza Vaccination: 10/2014





<Landen Betancourt - Last Filed: 06/24/18 11:25>





Review of Systems


Constitutional: Negative


Skin: Negative


Eyes: Negative


ENT: Negative


Respiratory: Cough


Cardiovascular: Negative


Gastrointestinal: Negative


Neurovascular: Negative


Neurological: Negative


Psychological: Negative


All Other Systems Reviewed And Are Negative: Yes





<Landen Betancourt - Last Filed: 06/24/18 11:25>





Physical Exam





- Summary


Physical Exam Summary: 





GENERAL: NAD. WDWN. No pain distress.


SKIN: No rashes, sores, lesions, or open wounds.


HEENT:


            Head: AT/NC


            Eyes: Conjunctiva clear without inflammation or discharge.


            Ears: Hearing grossly normal. TMs intact, no bulging, erythema, or 

edema. 


            Nose: Nasal mucosa pink and moist. NTTP maxillary and frontal 

sinus. 


            Throat: Posterior oropharynx without exudates, erythema, or 

tonsillar enlargement.  Uvula midline.


NECK: Supple. Nontender. No lymphadenopathy. 


CHEST: Mild wheezing RLL. No r/r. No accessory muscle use. Breathing 

comfortably and in no distress.


CV:  RRR. Without m/r/g. Pulses intact. Brisk cap refill.


NEURO: Alert. CN II-XII grossly intact.


PSYCH: Age appropriate behavior.


Triage Information Reviewed: Yes


Vital Signs: 


 Initial Vital Signs











Temp  97.7 F   06/24/18 10:43


 


Pulse  64   06/24/18 10:43


 


Resp  15   06/24/18 10:43


 


BP  112/56   06/24/18 10:43


 


Pulse Ox  98   06/24/18 10:43














<Landen Betancourt - Last Filed: 06/24/18 11:25>


Vital Signs: 


 Initial Vital Signs











Temp  97.7 F   06/24/18 10:43


 


Pulse  64   06/24/18 10:43


 


Resp  15   06/24/18 10:43


 


BP  112/56   06/24/18 10:43


 


Pulse Ox  98   06/24/18 10:43














<Tima Street - Last Filed: 06/24/18 13:53>





 Diagnostic Evaluation





- Laboratory


O2 Sat by Pulse Oximetry: 98





<Landen Betancourt - Last Filed: 06/24/18 11:25>





Respiratory Course/Dx





- Course


Course Of Treatment: Bronchitis - he says that he takes the zpak with good 

relief.





- Differential Dx/Diagnosis


Provider Diagnoses: Bronchitis





<Landen Betancourt Last Filed: 06/24/18 11:25>





Discharge





- Sign-Out/Discharge


Documenting (check all that apply): Discharge/Admit/Transfer





- Billing Disposition and Condition


Condition: STABLE


Disposition: Home





<Landen Betancourt Last Filed: 06/24/18 11:25>





- Billing Disposition and Condition


Condition: STABLE


Disposition: Home





<Tima Street - Last Filed: 06/24/18 13:53>





- Discharge Plan


Condition: Stable


Disposition: HOME


Referrals: 


Janie Gan MD [Primary Care Provider] - 


Additional Instructions: 


If you develop a fever, shortness of breath, chest pain, new or worsening 

symptoms - please call your PCP or go to the ED.


 











Per institutional requirements, I have reviewed the chart, however, I was not 

consulted specifically or made aware of this patient by the above midlevel 

provider.  I did not personally evaluate, interact with , or disposition  this 

patient.